# Patient Record
Sex: FEMALE | Race: WHITE | NOT HISPANIC OR LATINO
[De-identification: names, ages, dates, MRNs, and addresses within clinical notes are randomized per-mention and may not be internally consistent; named-entity substitution may affect disease eponyms.]

---

## 2019-07-30 ENCOUNTER — APPOINTMENT (OUTPATIENT)
Dept: OBGYN | Facility: CLINIC | Age: 69
End: 2019-07-30
Payer: MEDICARE

## 2019-07-30 VITALS
BODY MASS INDEX: 18.96 KG/M2 | DIASTOLIC BLOOD PRESSURE: 60 MMHG | WEIGHT: 118 LBS | SYSTOLIC BLOOD PRESSURE: 112 MMHG | HEIGHT: 66 IN

## 2019-07-30 DIAGNOSIS — M81.0 AGE-RELATED OSTEOPOROSIS W/OUT CURRENT PATHOLOGICAL FRACTURE: ICD-10-CM

## 2019-07-30 DIAGNOSIS — Z87.42 PERSONAL HISTORY OF OTHER DISEASES OF THE FEMALE GENITAL TRACT: ICD-10-CM

## 2019-07-30 DIAGNOSIS — F32.9 MAJOR DEPRESSIVE DISORDER, SINGLE EPISODE, UNSPECIFIED: ICD-10-CM

## 2019-07-30 DIAGNOSIS — Z87.19 PERSONAL HISTORY OF OTHER DISEASES OF THE DIGESTIVE SYSTEM: ICD-10-CM

## 2019-07-30 DIAGNOSIS — Z78.9 OTHER SPECIFIED HEALTH STATUS: ICD-10-CM

## 2019-07-30 DIAGNOSIS — Z82.5 FAMILY HISTORY OF ASTHMA AND OTHER CHRONIC LOWER RESPIRATORY DISEASES: ICD-10-CM

## 2019-07-30 DIAGNOSIS — Z01.419 ENCOUNTER FOR GYNECOLOGICAL EXAMINATION (GENERAL) (ROUTINE) W/OUT ABNORMAL FINDINGS: ICD-10-CM

## 2019-07-30 PROCEDURE — G0101: CPT

## 2019-08-01 PROBLEM — Z87.19 HISTORY OF IRRITABLE BOWEL SYNDROME: Status: RESOLVED | Noted: 2019-08-01 | Resolved: 2019-08-01

## 2019-08-01 PROBLEM — Z78.9 SOCIAL ALCOHOL USE: Status: ACTIVE | Noted: 2019-08-01

## 2019-08-01 PROBLEM — Z82.5 FAMILY HISTORY OF EMPHYSEMA: Status: ACTIVE | Noted: 2019-08-01

## 2019-08-01 PROBLEM — M81.0 OSTEOPOROSIS: Status: ACTIVE | Noted: 2019-08-01

## 2019-08-01 PROBLEM — F32.9 DEPRESSION: Status: ACTIVE | Noted: 2019-08-01

## 2019-08-01 PROBLEM — Z87.42 HISTORY OF TERMINATION OF PREGNANCY: Status: RESOLVED | Noted: 2019-08-01 | Resolved: 2019-08-01

## 2019-08-01 PROBLEM — Z78.9 DOES NOT USE ILLICIT DRUGS: Status: ACTIVE | Noted: 2019-08-01

## 2019-08-01 LAB — HPV HIGH+LOW RISK DNA PNL CVX: NOT DETECTED

## 2019-08-01 RX ORDER — BUPROPION HYDROCHLORIDE 300 MG/1
300 TABLET, EXTENDED RELEASE ORAL DAILY
Refills: 0 | Status: ACTIVE | COMMUNITY
Start: 2019-08-01

## 2019-08-01 NOTE — HISTORY OF PRESENT ILLNESS
[Menarche Age: ____] : age at menarche was [unfilled] [Menopause Age: ____] : age at menopause was [unfilled] [Sexually Active] : is sexually active

## 2019-08-01 NOTE — REVIEW OF SYSTEMS
[Feeling Tired] : feeling tired [Constipation] : constipation [Diarrhea] : diarrhea [Anxiety] : anxiety [Depression] : depression [Feeling Weak] : feeling week [Nl] : Integumentary

## 2019-08-05 LAB — CYTOLOGY CVX/VAG DOC THIN PREP: ABNORMAL

## 2019-11-15 ENCOUNTER — RECORD ABSTRACTING (OUTPATIENT)
Age: 69
End: 2019-11-15

## 2019-11-15 DIAGNOSIS — Z80.41 FAMILY HISTORY OF MALIGNANT NEOPLASM OF OVARY: ICD-10-CM

## 2019-11-15 DIAGNOSIS — N76.0 ACUTE VAGINITIS: ICD-10-CM

## 2019-11-15 DIAGNOSIS — Z87.891 PERSONAL HISTORY OF NICOTINE DEPENDENCE: ICD-10-CM

## 2019-11-15 DIAGNOSIS — B96.89 ACUTE VAGINITIS: ICD-10-CM

## 2019-11-15 LAB
CYTOLOGY CVX/VAG DOC THIN PREP: NEGATIVE
CYTOLOGY CVX/VAG DOC THIN PREP: NEGATIVE

## 2024-08-02 ENCOUNTER — APPOINTMENT (OUTPATIENT)
Dept: ORTHOPEDIC SURGERY | Facility: CLINIC | Age: 74
End: 2024-08-02